# Patient Record
Sex: MALE | Race: WHITE | NOT HISPANIC OR LATINO | URBAN - METROPOLITAN AREA
[De-identification: names, ages, dates, MRNs, and addresses within clinical notes are randomized per-mention and may not be internally consistent; named-entity substitution may affect disease eponyms.]

---

## 2017-04-20 ENCOUNTER — HOSPITAL ENCOUNTER (OUTPATIENT)
Dept: PHYSICAL MEDICINE AND REHAB | Facility: CLINIC | Age: 52
Discharge: HOME | End: 2017-04-20
Attending: NURSE PRACTITIONER

## 2017-04-20 DIAGNOSIS — M54.12 LEFT CERVICAL RADICULOPATHY: ICD-10-CM

## 2017-04-20 DIAGNOSIS — M25.812 SHOULDER IMPINGEMENT, LEFT: ICD-10-CM

## 2017-04-20 DIAGNOSIS — M54.12 RADICULITIS OF LEFT CERVICAL REGION: ICD-10-CM

## 2017-04-20 RX ORDER — GABAPENTIN 300 MG/1
900 CAPSULE ORAL 3 TIMES DAILY
Qty: 270 CAPSULE | Refills: 3 | Status: SHIPPED | OUTPATIENT
Start: 2017-04-20

## 2017-04-20 ASSESSMENT — MIFFLIN-ST. JEOR: SCORE: 2013.43

## 2017-04-28 ENCOUNTER — COMMUNICATION - HEALTHEAST (OUTPATIENT)
Dept: PHYSICAL MEDICINE AND REHAB | Facility: CLINIC | Age: 52
End: 2017-04-28

## 2017-04-28 ENCOUNTER — AMBULATORY - HEALTHEAST (OUTPATIENT)
Dept: PHYSICAL MEDICINE AND REHAB | Facility: CLINIC | Age: 52
End: 2017-04-28

## 2017-04-28 ENCOUNTER — HOSPITAL ENCOUNTER (OUTPATIENT)
Dept: RADIOLOGY | Facility: CLINIC | Age: 52
Discharge: HOME | End: 2017-04-28

## 2017-04-28 ENCOUNTER — HOSPITAL ENCOUNTER (OUTPATIENT)
Dept: MRI IMAGING | Facility: CLINIC | Age: 52
Discharge: HOME | End: 2017-04-28

## 2017-04-28 DIAGNOSIS — M54.12 RADICULITIS OF LEFT CERVICAL REGION: ICD-10-CM

## 2017-04-28 DIAGNOSIS — M25.812 SHOULDER IMPINGEMENT, LEFT: ICD-10-CM

## 2017-04-28 DIAGNOSIS — M54.12 LEFT CERVICAL RADICULOPATHY: ICD-10-CM

## 2017-04-28 DIAGNOSIS — M48.02 FORAMINAL STENOSIS OF CERVICAL REGION: ICD-10-CM

## 2017-05-01 ENCOUNTER — HOSPITAL ENCOUNTER (OUTPATIENT)
Dept: PHYSICAL MEDICINE AND REHAB | Facility: CLINIC | Age: 52
Discharge: HOME | End: 2017-05-01
Attending: PHYSICAL MEDICINE & REHABILITATION

## 2017-05-01 DIAGNOSIS — M54.12 CERVICAL RADICULAR PAIN: ICD-10-CM

## 2017-05-01 DIAGNOSIS — M79.18 MYOFASCIAL PAIN: ICD-10-CM

## 2017-05-01 DIAGNOSIS — G47.9 SLEEP DIFFICULTIES: ICD-10-CM

## 2017-05-01 DIAGNOSIS — M48.02 FORAMINAL STENOSIS OF CERVICAL REGION: ICD-10-CM

## 2017-05-01 DIAGNOSIS — M54.12 LEFT CERVICAL RADICULOPATHY: ICD-10-CM

## 2017-05-01 RX ORDER — NABUMETONE 500 MG/1
500-1000 TABLET, FILM COATED ORAL 2 TIMES DAILY PRN
Qty: 90 TABLET | Refills: 2 | Status: SHIPPED | OUTPATIENT
Start: 2017-05-01

## 2017-05-01 ASSESSMENT — MIFFLIN-ST. JEOR: SCORE: 2012.98

## 2017-05-02 ENCOUNTER — HOSPITAL ENCOUNTER (OUTPATIENT)
Dept: PHYSICAL MEDICINE AND REHAB | Facility: CLINIC | Age: 52
Discharge: HOME | End: 2017-05-02
Attending: PHYSICAL MEDICINE & REHABILITATION

## 2017-05-02 ENCOUNTER — AMBULATORY - HEALTHEAST (OUTPATIENT)
Dept: PHYSICAL MEDICINE AND REHAB | Facility: CLINIC | Age: 52
End: 2017-05-02

## 2017-05-02 DIAGNOSIS — M54.12 CERVICAL RADICULITIS: ICD-10-CM

## 2017-05-02 DIAGNOSIS — M54.12 CERVICAL RADICULAR PAIN: ICD-10-CM

## 2017-05-02 DIAGNOSIS — M54.12 LEFT CERVICAL RADICULOPATHY: ICD-10-CM

## 2017-05-10 ENCOUNTER — HOSPITAL ENCOUNTER (OUTPATIENT)
Dept: PHYSICAL MEDICINE AND REHAB | Facility: CLINIC | Age: 52
Discharge: HOME | End: 2017-05-10
Attending: PHYSICAL MEDICINE & REHABILITATION

## 2017-05-10 DIAGNOSIS — M54.12 CERVICAL RADICULITIS: ICD-10-CM

## 2017-05-30 ENCOUNTER — COMMUNICATION - HEALTHEAST (OUTPATIENT)
Dept: PHYSICAL MEDICINE AND REHAB | Facility: CLINIC | Age: 52
End: 2017-05-30

## 2021-05-30 VITALS — WEIGHT: 259.1 LBS | BODY MASS INDEX: 36.27 KG/M2 | HEIGHT: 71 IN

## 2021-05-30 VITALS — HEIGHT: 71 IN | WEIGHT: 259 LBS | BODY MASS INDEX: 36.26 KG/M2

## 2021-06-10 NOTE — PROGRESS NOTES
Assessment/Plan:      Diagnoses and all orders for this visit:    Left cervical radiculopathy  -     Ambulatory referral to PT/OT  -     nabumetone (RELAFEN) 500 MG tablet; Take 1-2 tablets (500-1,000 mg total) by mouth 2 (two) times a day as needed for pain.  Dispense: 90 tablet; Refill: 2  -     OPS TFESI C/T Spine Unilateral; Future; Expected date: 5/1/17    Cervical radicular pain  -     Ambulatory referral to PT/OT  -     nabumetone (RELAFEN) 500 MG tablet; Take 1-2 tablets (500-1,000 mg total) by mouth 2 (two) times a day as needed for pain.  Dispense: 90 tablet; Refill: 2  -     OPS TFESI C/T Spine Unilateral; Future; Expected date: 5/1/17    Foraminal stenosis of cervical region  -     nabumetone (RELAFEN) 500 MG tablet; Take 1-2 tablets (500-1,000 mg total) by mouth 2 (two) times a day as needed for pain.  Dispense: 90 tablet; Refill: 2    Myofascial pain  -     nabumetone (RELAFEN) 500 MG tablet; Take 1-2 tablets (500-1,000 mg total) by mouth 2 (two) times a day as needed for pain.  Dispense: 90 tablet; Refill: 2    Sleep difficulties  -     nabumetone (RELAFEN) 500 MG tablet; Take 1-2 tablets (500-1,000 mg total) by mouth 2 (two) times a day as needed for pain.  Dispense: 90 tablet; Refill: 2        Assessment:    1.  Left cervical spine and arm pain and paresthesias throughout the entire arm and hand although most symptoms are in a C5 distribution.  He has severe foraminal stenosis in the left at C4-5 which would correlate with his C5 radicular pain.  2.  Myofascial pain.  3.  Poor sleep related to pain      Discussion:    1.  I discussed the diagnoses and treatment options.  We discussed the MRI images as well as shoulder imaging.  We discussed conservative options as well as surgical referral.  He would like to continue with conservative management.  2.  Start physical therapy.  3.  Trial nabumetone for pain.  4.  Left C4-5 transforaminal epidural steroid injection.  5.  Follow-up Zandra 2-4 weeks  after injection.      It was our pleasure caring for your patient today, if there any questions or concerns please do not hesitate to contact us.    25 minutes were spent with this patient in addition to any procedure with greater than 20 minutes in counseling and coordination of care.  Subjective:   Patient ID: Kristian Abdul is a 52 y.o. male.    History of Present Illness: Patient presents for evaluation of cervical spine left arm pain and paresthesias.  Presents with his wife.  Symptoms started approximately 5 weeks ago.  Started slowly in the left cervical spine parascapular region down the left arm.  This was after lifting a wood splitter and moving it in his yard.  2 weeks ago had significant increase in pain with no new trauma.  Pain is constant numbness and tingling down his arm into his entire hand.  Most significant in the shoulder area.  Worse with any use of his arm sitting looking down at the computer even walking.  Pain is an 8/10 today 10/10 at worst.  Better with lying down as well as putting his left arm over his head.    Has been to the emergency department.  Tried prednisone.  Taking oxycodone no help.  Taking gabapentin.  Recently had MRI which was reviewed today that several questions all questions were answered.      Imaging: MRI report and images were personally reviewed and discussed with the patient.  A plastic model was utilized during the discussion.  MRI of the cervical spine personally reviewed.  Multilevel degenerative disc disease.  Broad-based disc bulge C4-5 and C5-6.  At C4-5 on the left there is severe foraminal stenosis related to disc osteophyte complex.  Mild central disc bulge at C5-6.  There is some at least moderate foraminal stenosis at C5-6.    Plain film imaging of the left shoulder reviewed.  Negative.    Review of systems: Numbness and tingling in the left arm along with weakness.  No bowel or bladder incontinence, headache, dizziness, nausea, vomiting, blurred vision  or balance changes.      Past Medical History:   Diagnosis Date     Heartburn      Hyperlipidemia      Hypertension        The following portions of the patient's history were reviewed and updated as appropriate: allergies, current medications, past family history, past medical history, past social history, past surgical history and problem list.      Objective:   Physical Exam:    Vitals:    05/01/17 1132   BP: 152/90   Pulse: 73       General: Alert and oriented with normal affect. Attention, knowledge, memory, and language are intact. No acute distress.   Eyes: Sclerae are clear.  Respirations: Unlabored.     Gait:  Nonantalgic    Sensation is decreased to light touch over the left deltoid  Reflexes are 1+ and symmetric in the biceps triceps and brachioradialis with negative Hoffmans.    Pain with arm drop empty can testing on the left.  Manual muscle testing reveals:  Right /Left out of 5  5/5- shoulder abductors, possible give way weakness  5/5 elbow flexors  5/5 elbow extensors  5/5 wrist extensors  5/5 interosseus  5/5 finger flexors

## 2021-06-10 NOTE — PROGRESS NOTES
ASSESSMENT: Kristian Abdul is a 52 y.o. male presents for consultation at the request of HE PCP No Primary Care Provider, with past medical history significant for hypertension, hyperlipidemia, right rotator cuff repair 2013 who presents today for new patient evaluation of ongoing left cervical radiculitis/radiculopathy for the last couple of weeks that is pressing.  He is strong on exam however does have sensory deficits into the left radial forearm, does have positive Spurling maneuver on the left, this is consistent with cervical nerve involvement.  Does also have some pain with left shoulder range of motion, full range of motion, however negative empty can.    NDI: 56%     WHO-5: 11    PHQ-2: 0    Diagnoses and all orders for this visit:    Radiculitis of left cervical region  -     MR Cervical Spine Without Contrast; Future; Expected date: 4/20/17  -     gabapentin (NEURONTIN) 300 MG capsule; Take 3 capsules (900 mg total) by mouth 3 (three) times a day. Follow Gabapentin Dosing chart given  Dispense: 270 capsule; Refill: 3  -     oxyCODONE-acetaminophen (PERCOCET) 5-325 mg per tablet; Take 1 tablet by mouth every 6 (six) hours as needed.  Dispense: 28 tablet; Refill: 0    Left cervical radiculopathy  -     MR Cervical Spine Without Contrast; Future; Expected date: 4/20/17  -     gabapentin (NEURONTIN) 300 MG capsule; Take 3 capsules (900 mg total) by mouth 3 (three) times a day. Follow Gabapentin Dosing chart given  Dispense: 270 capsule; Refill: 3    Shoulder impingement, left  -     XR Shoulder Left 2 or More VWS; Future; Expected date: 4/20/17       PLAN:  Reviewed spine anatomy and disease process. Discussed diagnosis and treatment options with the patient today. A shared decision making model was used. The patient's values and choices were respected. The following represents what was discussed and decided upon by the provider and the patient.     -DIAGNOSTIC TESTS: Ordered cervical spine MRI and left  shoulder x-ray today to further evaluate radiculitis.    -PHYSICAL THERAPY: Will discuss physical therapy pending MRI and x-ray review.  Discussed the importance of core strengthening, ROM, stretching exercises with the patient and how each of these entities is important in decreasing pain.  Explained to the patient that the purpose of physical therapy is to teach the patient a home exercise program.  These exercises need to be performed every day in order to decrease pain and prevent future occurrences of pain.  Likened it to brushing one's teeth.      -MEDICATIONS: Prescribed gabapentin 300 mg to titrate up to 3 tablets 3 times a day as tolerated for radicular pain.  -Also refilled Percocet 1 tablet every 6 hours as needed for severe breakthrough pain, #28 given for 7 days worth.  [place]  checked. Discussed the risks (eg, addiction, overdose, worsening pain) verses benefit of opioid use with patient today. Explained that this medication will not be a long term solution to ongoing pain. Discussed using lowest effective dose and the importance of other measures for pain management including PT, other non-opioid medications, behavioral treatments, and other procedure options.   Discussed side effects of medications and proper use. Patient verbalized understanding.    -INTERVENTIONS: Consider left cervical transforaminal versus interlaminar DEANDRE pending MRI review.    -PATIENT EDUCATION: 45 minutes of total visit time was spent face to face with the patient today, 60% of the visit was spent on counseling, education, and coordinating care.     -FOLLOW-UP:   Follow-up after obtaining MRI to review images and ongoing plan.    Advised pt to call [facility] if symptoms worsen or you have problems controlling bladder and bowel function.   ______________________________________________________________________    SUBJECTIVE:   Kristian Abdul  is a 52 y.o. male who presents today for new patient evaluation of neck pain on  the left that radiates in the left periscapular, lateral deltoid forearm and into the left hand nonspecific with associated numbness and tingling last couple of weeks after lifting a wood splitter.  He does report over the last week pain has been progressing and is not able to get relief with any movement or medications.  He denies weakness, denies issues with balance changes, denies fine motor skill changes as well.  Denies bowel or bladder dysfunction.    History of right rotator cuff repair.    Treatment to Date: No prior physical therapy, spinal injections or spinal surgery.    Medications: Medrol Dose Pack ED 4/17/17 with no relief.   Norflex ED with no relief.  Percocet ED with some relief.    Current Outpatient Prescriptions on File Prior to Encounter   Medication Sig Dispense Refill     atorvastatin (LIPITOR) 20 MG tablet Take 1 tablet (20 mg total) by mouth bedtime. 30 tablet 2     lisinopril (PRINIVIL,ZESTRIL) 10 MG tablet Take 1 tablet (10 mg total) by mouth daily. 90 tablet 1     orphenadrine (NORFLEX) 100 mg tablet Take 1 tablet (100 mg total) by mouth 2 (two) times a day for 10 days. 20 tablet 0     oxyCODONE-acetaminophen (PERCOCET) 5-325 mg per tablet Take 1 tablet by mouth every 4 (four) hours as needed. 20 tablet 0     predniSONE (DELTASONE) 10 MG tablet Take 4 tablets (40 mg total) by mouth daily for 5 days. 20 tablet 0     No current facility-administered medications on file prior to encounter.        Allergies   Allergen Reactions     Amoxicillin Rash     Penicillins Rash       Past Medical History:   Diagnosis Date     Heartburn      Hyperlipidemia      Hypertension         Patient Active Problem List   Diagnosis     Chest pain     Hypertension     Hyperlipidemia       Past Surgical History:   Procedure Laterality Date     ROTATOR CUFF REPAIR         Family History   Problem Relation Age of Onset     Heart failure Father      COPD Father      Pacemaker Father      Hypertension Father       "Diabetes Maternal Grandmother      Diabetes Maternal Grandfather      Diabetes Maternal Aunt      Diabetes Maternal Uncle      Colon cancer Cousin 55     x2     No Medical Problems Sister      No Medical Problems Sister        SOCIAL HX: Patient is single, works as a supervisor.  Patient denies smoking history, does drink alcohol however denies being a heavy drinker, denies illicit drug use.    ROS: Positive for changes in vision, chest pain, abdominal pain, indigestion, back pain, joint pain, leg pain.  Specifically negative for bowel/bladder dysfunction, balance changes, headache, dizziness, foot drop, fevers, chills, appetite changes, nausea/vomiting, unexplained weight loss. Otherwise 13 systems reviewed are negative. Please see the patient's intake questionnaire from today for details.    OBJECTIVE:  /89 (Patient Site: Right Arm, Patient Position: Sitting)  Pulse 65  Ht 5' 10.75\" (1.797 m)  Wt (!) 259 lb 1.6 oz (117.5 kg)  BMI 36.39 kg/m2    PHYSICAL EXAMINATION:  --CONSTITUTIONAL: Vital signs as above. No acute distress. The patient is well nourished and well groomed.  --PSYCHIATRIC: The patient is awake, alert, oriented to person, place, time and answering questions appropriately with clear speech. Appropriate mood and affect   --HEENT: Sclera are non-injected. Extraocular muscles are intact. Thyroid moves easily upon swallowing.  Moist oral mucosa.  --SKIN: Skin over the face, bilateral lower extremities, and posterior torso is clean, dry, intact without rashes.  --RESPIRATORY: Normal rhythm and effort. No abnormal accessory muscle breathing patterns noted.   --GROSS MOTOR: Easily arises from a seated position. Toe walking and heel walking are normal.    --CERVICAL SPINE: Inspection reveals no evidence of deformity. Range of motion is not limited in cervical flexion, extension, lateral rotation, however does have increased left neck pain rotation to the right.  Minimal cervical tenderness to " palpation.  Spurling maneuver negative on the right, positive on the left.  --SHOULDERS: Full range of motion bilaterally, does have increased pain to the left shoulder with range of motion of above shoulder level. Negative empty can.  --UPPER EXTREMITY MOTOR TESTING:  Wrist flexion left 5/5, right 5/5  Wrist extension left 5/5, right 5/5  Pronators left 5/5, right 5/5  Biceps left 5/5, right 5/5   Triceps left 5/5, right 5/5   Shoulder abduction left 5/5, right 5/5   left 5/5, right 5/5  --NEUROLOGIC: CN III-XII are grossly intact. 2/4 symmetric biceps, brachioradialis, triceps reflexes bilaterally. Sensation to upper extremities is intact on the right, diminished in the left radial forearm only.  Negative Schroeder's bilaterally.    --VASCULAR: 2/4 radial pulses bilaterally. Warm upper limbs bilaterally. Capillary refill in the upper extremities is less than 1 second.    RESULTS: Prior medical records from 4/17/17 were reviewed today.     Imaging: No results found.